# Patient Record
Sex: FEMALE | URBAN - NONMETROPOLITAN AREA
[De-identification: names, ages, dates, MRNs, and addresses within clinical notes are randomized per-mention and may not be internally consistent; named-entity substitution may affect disease eponyms.]

---

## 2017-06-22 ENCOUNTER — APPOINTMENT (RX ONLY)
Dept: URBAN - NONMETROPOLITAN AREA CLINIC 1 | Facility: CLINIC | Age: 57
Setting detail: DERMATOLOGY
End: 2017-06-22

## 2017-06-22 DIAGNOSIS — L82.1 OTHER SEBORRHEIC KERATOSIS: ICD-10-CM

## 2017-06-22 DIAGNOSIS — L81.1 CHLOASMA: ICD-10-CM

## 2017-06-22 PROBLEM — E05.90 THYROTOXICOSIS, UNSPECIFIED WITHOUT THYROTOXIC CRISIS OR STORM: Status: ACTIVE | Noted: 2017-06-22

## 2017-06-22 PROCEDURE — ? DIAGNOSIS COMMENT

## 2017-06-22 PROCEDURE — ? COUNSELING

## 2017-06-22 PROCEDURE — 99203 OFFICE O/P NEW LOW 30 MIN: CPT

## 2017-06-22 PROCEDURE — ? IN-HOUSE DISPENSING PHARMACY

## 2017-06-22 ASSESSMENT — LOCATION SIMPLE DESCRIPTION DERM
LOCATION SIMPLE: RIGHT LIP
LOCATION SIMPLE: GROIN

## 2017-06-22 ASSESSMENT — LOCATION DETAILED DESCRIPTION DERM
LOCATION DETAILED: RIGHT INGUINAL CREASE
LOCATION DETAILED: RIGHT UPPER CUTANEOUS LIP

## 2017-06-22 ASSESSMENT — LOCATION ZONE DERM
LOCATION ZONE: LIP
LOCATION ZONE: TRUNK

## 2017-06-22 NOTE — HPI: DISCOLORATION (MELASMA)
How Severe Are They?: moderate
Is This A New Presentation, Or A Follow-Up?: Discoloration
Additional History: Patient has used prescription hydroquinone cream in the past with good results

## 2017-06-22 NOTE — PROCEDURE: DIAGNOSIS COMMENT
Comment: Discussed that dark hairs on upper lip contribute to appearance of dark skin on the upper lip.  Patient states that she often shaves it.  Suggested laser hair removal may be helpful, but would need to use hydroquinone cream prior to treatment since laser can cause hyperpigmentation.
Detail Level: Simple

## 2017-06-22 NOTE — PROCEDURE: IN-HOUSE DISPENSING PHARMACY
Product 79 Price/Unit (In Dollars): 0
Product 71 Unit Type: mg
Product 1 Amount/Unit (Numbers Only): 1
Product 1 Refills: 3
Product 2 Unit Type: bottle(s)
Name Of Product 3: Latisse
Name Of Product 2: HQRA +
Detail Level: Simple
Name Of Product 1: Skin Lightening Cream #5
Product 2 Application Directions: Apply to brown spots at bedtime.  Start once or twice a week and gradually increase frequency as tolerated.
Product 3 Application Directions: Apply to base of upper eye lashes at bedtime.
Render Refills If Set To 0: Yes
Product 1 Application Directions: Apply to brown spots twice daily under skin care products and sunscreen.
Render Product Pricing In Note: No

## 2017-06-23 ENCOUNTER — OFFICE VISIT (OUTPATIENT)
Dept: ENDOCRINOLOGY | Facility: MEDICAL CENTER | Age: 57
End: 2017-06-23
Payer: COMMERCIAL

## 2017-06-23 VITALS
SYSTOLIC BLOOD PRESSURE: 124 MMHG | BODY MASS INDEX: 25.45 KG/M2 | DIASTOLIC BLOOD PRESSURE: 78 MMHG | OXYGEN SATURATION: 94 % | WEIGHT: 134.8 LBS | HEART RATE: 71 BPM | HEIGHT: 61 IN

## 2017-06-23 DIAGNOSIS — E05.00 GRAVES DISEASE: ICD-10-CM

## 2017-06-23 DIAGNOSIS — E05.90 HYPERTHYROIDISM: ICD-10-CM

## 2017-06-23 PROCEDURE — 99204 OFFICE O/P NEW MOD 45 MIN: CPT | Performed by: INTERNAL MEDICINE

## 2017-06-23 RX ORDER — PROPRANOLOL HYDROCHLORIDE 20 MG/1
20 TABLET ORAL 3 TIMES DAILY
COMMUNITY

## 2017-06-23 RX ORDER — METHIMAZOLE 5 MG/1
7.5 TABLET ORAL DAILY
COMMUNITY
End: 2017-06-23

## 2017-06-23 RX ORDER — METHIMAZOLE 5 MG/1
5 TABLET ORAL DAILY
Qty: 90 TAB | COMMUNITY
Start: 2017-06-23 | End: 2017-08-23 | Stop reason: SDUPTHER

## 2017-06-23 RX ORDER — ESCITALOPRAM OXALATE 20 MG/1
20 TABLET ORAL DAILY
COMMUNITY

## 2017-06-23 NOTE — MR AVS SNAPSHOT
"Norm Leyva   2017 8:40 AM   Office Visit   MRN: [id number]    Department:  Endocrinology Med [facility]   Dept Phone:  [phone]    Description:  Female : 1960   Provider:  Raymond Juarez M.D.           Reason for Visit     New Patient Thyroid      Allergies as of 2017     Allergen Noted Reactions    Ciprofloxacin Hcl 2017   Hives    itching      You were diagnosed with     Graves disease   [755071]       Hyperthyroidism   [457664]         Vital Signs     Blood Pressure Pulse Height Weight Body Mass Index Oxygen Saturation    124/78 mmHg 71 1.549 m (5' 1\") 61.145 kg (134 lb 12.8 oz) 25.48 kg/m2 94%    Smoking Status                   Never Smoker            Basic Information     Date Of Birth Sex Race Ethnicity Preferred Language    1960 Female White Non- English      Problem List              ICD-10-CM Priority Class Noted - Resolved    Graves disease E05.00   2017 - Present    Hyperthyroidism E05.90   2017 - Present      Health Maintenance        Date Due Completion Dates    IMM DTaP/Tdap/Td Vaccine (1 - Tdap) 1980 ---    PAP SMEAR 1982 ---    MAMMOGRAM 3/13/2005 3/13/2004    COLONOSCOPY 2011 ---            Current Immunizations     No immunizations on file.      Below and/or attached are the medications your provider expects you to take. Review all of your home medications and newly ordered medications with your provider and/or pharmacist. Follow medication instructions as directed by your provider and/or pharmacist. Please keep your medication list with you and share with your provider. Update the information when medications are discontinued, doses are changed, or new medications (including over-the-counter products) are added; and carry medication information at all times in the event of emergency situations     Allergies:  CIPROFLOXACIN HCL - Hives               Medications  Valid as of: 2017 -  9:12 AM    Generic Name Brand Name Tablet " Size Instructions for use    Escitalopram Oxalate (Tab) LEXAPRO 20 MG Take 20 mg by mouth every day.        Ibuprofen (Cap) Ibuprofen 200 MG Take  by mouth as needed.        MethIMAzole (Tab) TAPAZOLE 5 MG Take 1 Tab by mouth every day.        Propranolol HCl (Tab) INDERAL 20 MG Take 20 mg by mouth 3 times a day.        .                 Medicines prescribed today were sent to:     [place], [place] - [address]    [address] [facility] [address]    Phone: [phone] Fax: [phone]    Open 24 Hours?: No      Medication refill instructions:       If your prescription bottle indicates you have medication refills left, it is not necessary to call your provider’s office. Please contact your pharmacy and they will refill your medication.    If your prescription bottle indicates you do not have any refills left, you may request refills at any time through one of the following ways: The online [employer] system (except Urgent Care), by calling your provider’s office, or by asking your pharmacy to contact your provider’s office with a refill request. Medication refills are processed only during regular business hours and may not be available until the next business day. Your provider may request additional information or to have a follow-up visit with you prior to refilling your medication.   *Please Note: Medication refills are assigned a new Rx number when refilled electronically. Your pharmacy may indicate that no refills were authorized even though a new prescription for the same medication is available at the pharmacy. Please request the medicine by name with the pharmacy before contacting your provider for a refill.        Your To Do List     Future Labs/Procedures Complete By Expires    FREE THYROXINE  As directed 6/23/2018    FREE THYROXINE  As directed 6/23/2018    FREE THYROXINE  As directed 6/23/2018    TSH  As directed 6/23/2018    TSH  As directed 6/23/2018    TSH  As  directed 6/23/2018      Instructions    Watch for two rare but very important potential side effects of methimazole:   1. Suppression of infection-fighting white blood cells -- leading to fever due to infection (can be any type of infection)  2. Liver damage --leading to jaundice (yellowness of eyes/skin, dark colored urine)    So in case you develop fever or jaundice (yellowness of eyes/skin) you stop methimazole right away and call our clinic, so that we can order labs.          DealHamster Access Code: [email or web address]  Expires: 7/23/2017  9:12 AM    DealHamster  A secure, online tool to manage your health information     [employer]’s DealHamster® is a secure, online tool that connects you to your personalized health information from the privacy of your home -- day or night - making it very easy for you to manage your healthcare. Once the activation process is completed, you can even access your medical information using the DealHamster radha, which is available for free in the Apple Radha store or Google Play store.     DealHamster provides the following levels of access (as shown below):   My Chart Features   [facility] Doctor [facility]  Specialists [facility]  Urgent  Care [facility]  Primary Care  Doctor   Email your healthcare team securely and privately 24/7 X X X    Manage appointments: schedule your next appointment; view details of past/upcoming appointments X      Request prescription refills. X      View recent personal medical records, including lab and immunizations X X X X   View health record, including health history, allergies, medications X X X X   Read reports about your outpatient visits, procedures, consult and ER notes X X X X   See your discharge summary, which is a recap of your hospital and/or ER visit that includes your diagnosis, lab results, and care plan. X X       How to register for DealHamster:  1. Go to  [email or web address].  2. Click on the Sign Up Now box, which takes you to the New Member  Sign Up page. You will need to provide the following information:  a. Enter your Tittat Access Code exactly as it appears at the top of this page. (You will not need to use this code after you’ve completed the sign-up process. If you do not sign up before the expiration date, you must request a new code.)   b. Enter your date of birth.   c. Enter your home email address.   d. Click Submit, and follow the next screen’s instructions.  3. Create a Tittat ID. This will be your Tittat login ID and cannot be changed, so think of one that is secure and easy to remember.  4. Create a Tittat password. You can change your password at any time.  5. Enter your Password Reset Question and Answer. This can be used at a later time if you forget your password.   6. Enter your e-mail address. This allows you to receive e-mail notifications when new information is available in Tittat.  7. Click Sign Up. You can now view your health information.    For assistance activating your Tittat account, call [phone]

## 2017-06-23 NOTE — PATIENT INSTRUCTIONS
Watch for two rare but very important potential side effects of methimazole:   1. Suppression of infection-fighting white blood cells -- leading to fever due to infection (can be any type of infection)  2. Liver damage --leading to jaundice (yellowness of eyes/skin, dark colored urine)    So in case you develop fever or jaundice (yellowness of eyes/skin) you stop methimazole right away and call our clinic, so that we can order labs.

## 2017-06-23 NOTE — PROGRESS NOTES
New Patient Consult Note  PCP: Shena Francis PA-C    Reason for consult: Hyperthyroidism    HPI:  Norm Leyva is a 56 y.o. old patient who comes in today for evaluation of hyperthyroidism. Labs in 2016 and 2016 showed suppressed TSH of 0.02. She also had significantly elevated TSI. Radioactive iodine uptake and scan in 2017 showed diffusely increased uptake consistent with diagnoses of hyperthyroidism. In 2017 she was started on methimazole, since May 2017 she is on methimazole 7.5 mg daily. She feels tired. She has gained 30 pounds. Previously before the diagnosis of hyperthyroidism she was experiencing unintentional weight loss, palpitations, racing heart, hand tremors, which have now resolved. She has family history of hypothyroidism. She has seen an ophthalmologist, was found to have slight proptosis of right eye, no pain or pressure behind the eyes, occasional intermittent double vision.     ROS:  Constitutional: Positive for weight gain  Cardiac: No palpitations or racing heart  Resp: No shortness of breath  All other systems were reviewed and were negative.    Past Medical History:  Patient Active Problem List    Diagnosis Date Noted   • Graves disease 2017   • Hyperthyroidism 2017       Past Surgical History:  Past Surgical History   Procedure Laterality Date   • Cystoscopy stent placement  1/15/11   • Primary c section  84; 82        • Cystoscopy  2011     Performed by NATASHA DONOVAN at [facility]   • Ureteroscopy  2011     Performed by NATASHA DONOVAN at [facility]       Allergies:  Ciprofloxacin hcl    Social History:  Social History     Social History   • Marital Status:      Spouse Name: N/A   • Number of Children: N/A   • Years of Education: N/A     Occupational History   • Not on file.     Social History Main Topics   • Smoking status: Never Smoker    • Smokeless tobacco: Not  "on file   • Alcohol Use: No   • Drug Use: No   • Sexual Activity: Not on file     Other Topics Concern   • Not on file     Social History Narrative       Family History:  Family History   Problem Relation Age of Onset   • Cancer     • Heart Disease         Medications:    Current outpatient prescriptions:   •  escitalopram (LEXAPRO) 20 MG tablet, Take 20 mg by mouth every day., Disp: , Rfl:   •  propranolol (INDERAL) 20 MG Tab, Take 20 mg by mouth 3 times a day., Disp: , Rfl:   •  methimazole (TAPAZOLE) 5 MG Tab, Take 1 Tab by mouth every day., Disp: 90 Tab, Rfl:   •  Ibuprofen 200 MG CAPS, Take  by mouth as needed., Disp: , Rfl:     Labs:  Labs on 6/20/2017: Free T4 0.61    Physical Examination:  Vital signs: /78 mmHg  Pulse 71  Ht 1.549 m (5' 1\")  Wt 61.145 kg (134 lb 12.8 oz)  BMI 25.48 kg/m2  SpO2 94%  General: No apparent distress, cooperative  Eyes: No scleral icterus or discharge  ENMT: Normal on external inspection of nose, lips  Neck: No abnormal masses on inspection  Resp: Normal effort, clear to auscultation bilaterally   CVS: Regular rate and rhythm, S1 S2 normal, no murmur   Extremities: No edema  Neuro: Alert and oriented  Skin: No rash  Psych: Normal mood and affect    Assessment and Plan:    1. Graves disease, hyperthyroidism  · Recent free T4 is at the lower end of normal at 0.61 on methimazole 7.5 mg daily  · Lower methimazole to 5 mg daily  · Advised to repeat labs for TSH and free T4 every one month for now  · We discussed about potential side effects of methimazole including agranulocytosis and hepatotoxicity  · We also discussed about radioactive iodine ablation of thyroidectomy as treatment options, she is interested in continuing methimazole  - FREE THYROXINE; Future  - TSH; Future  - TSH; Future  - FREE THYROXINE; Future  - FREE THYROXINE; Future  - TSH; Future    Return in about 3 months (around 9/23/2017).    Thank you for allowing me to participate in the care of this " patient.    Raymond Juarez M.D.  06/23/2017    CC:   Shena Francis PA-C    This note was created using voice recognition software (Dragon). The accuracy of the dictation is limited by the abilities of the software. I have reviewed the note prior to signing, however some errors in grammar and context are still possible. If you have any questions related to this note please do not hesitate to contact our office.

## 2017-07-21 ENCOUNTER — TELEPHONE (OUTPATIENT)
Dept: ENDOCRINOLOGY | Facility: MEDICAL CENTER | Age: 57
End: 2017-07-21

## 2017-07-25 ENCOUNTER — TELEPHONE (OUTPATIENT)
Dept: ENDOCRINOLOGY | Facility: MEDICAL CENTER | Age: 57
End: 2017-07-25

## 2017-07-26 NOTE — TELEPHONE ENCOUNTER
Thyroid hormone levels in July are slightly on the high side (in June free T4 was low, that is why we lowered the dose of methimazole, now free T4 is in the normal range but towards the upper end).    I would recommend to continue methimazole 5 mg daily. And repeat labs for TSH and free T4 in one month.        ____________________________________________  Labs on 7/20/2017: TSH 0.03, free T4 1.56  Labs on 6/20/2017: Free T4 0.61

## 2017-08-23 ENCOUNTER — TELEPHONE (OUTPATIENT)
Dept: ENDOCRINOLOGY | Facility: MEDICAL CENTER | Age: 57
End: 2017-08-23

## 2017-08-23 DIAGNOSIS — E05.90 HYPERTHYROIDISM: ICD-10-CM

## 2017-08-23 RX ORDER — METHIMAZOLE 5 MG/1
7.5 TABLET ORAL DAILY
Qty: 135 TAB | Refills: 1 | Status: SHIPPED | OUTPATIENT
Start: 2017-08-23 | End: 2017-11-28 | Stop reason: SDUPTHER

## 2017-08-23 NOTE — TELEPHONE ENCOUNTER
Yes, lets increase the dose to 7.5 mg daily. I will send a prescription for 5 mg tablets, 1-1/2 tablets per day.

## 2017-08-23 NOTE — TELEPHONE ENCOUNTER
Called Pt and notified     Free T4 is within normal range at 1.3. TSH is suppressed at 0.02. For now I would recommend to continue on methimazole 7.5 mg daily. Repeat labs for TSH and free T4 in 4-6 weeks.    Pt mentioned that she is taking Methimazole 5 mg once a day.  Do she need to increase her dose to 7.5 mg once a day?    If so, she need a new Rx for 2.5 mg send to [facility]    Thank you  Rayna

## 2017-11-28 DIAGNOSIS — E05.90 HYPERTHYROIDISM: ICD-10-CM

## 2017-11-28 RX ORDER — METHIMAZOLE 5 MG/1
7.5 TABLET ORAL DAILY
Qty: 135 TAB | Refills: 1 | Status: SHIPPED | OUTPATIENT
Start: 2017-11-28

## 2018-01-31 ENCOUNTER — RX ONLY (OUTPATIENT)
Age: 58
Setting detail: RX ONLY
End: 2018-01-31

## 2018-01-31 RX ORDER — FLUOCINOLONE ACETONIDE, HYDROQUINONE, AND TRETINOIN .1; 40; .5 MG/G; MG/G; MG/G
CREAM TOPICAL
Qty: 1 | Refills: 0 | Status: ERX | COMMUNITY
Start: 2018-01-31

## 2018-02-05 ENCOUNTER — RX ONLY (OUTPATIENT)
Age: 58
Setting detail: RX ONLY
End: 2018-02-05

## 2018-02-05 RX ORDER — HYDROQUINONE 4 %
CREAM (GRAM) TOPICAL
Qty: 1 | Refills: 3 | Status: ERX | COMMUNITY
Start: 2018-02-05

## 2024-02-17 ENCOUNTER — APPOINTMENT (OUTPATIENT)
Dept: RADIOLOGY | Facility: MEDICAL CENTER | Age: 64
End: 2024-02-17
Attending: EMERGENCY MEDICINE
Payer: COMMERCIAL

## 2024-02-17 ENCOUNTER — HOSPITAL ENCOUNTER (EMERGENCY)
Facility: MEDICAL CENTER | Age: 64
End: 2024-02-17
Attending: EMERGENCY MEDICINE
Payer: COMMERCIAL

## 2024-02-17 VITALS
OXYGEN SATURATION: 97 % | DIASTOLIC BLOOD PRESSURE: 70 MMHG | HEART RATE: 67 BPM | TEMPERATURE: 98.3 F | WEIGHT: 163 LBS | HEIGHT: 60 IN | BODY MASS INDEX: 32 KG/M2 | SYSTOLIC BLOOD PRESSURE: 150 MMHG | RESPIRATION RATE: 18 BRPM
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
## 2024-02-17 DIAGNOSIS — S00.83XA CONTUSION OF FACE, INITIAL ENCOUNTER: ICD-10-CM

## 2024-02-17 DIAGNOSIS — S00.03XA CONTUSION OF SCALP, INITIAL ENCOUNTER: ICD-10-CM

## 2024-02-17 DIAGNOSIS — T07.XXXA ABRASIONS OF MULTIPLE SITES: ICD-10-CM

## 2024-02-17 PROCEDURE — 90471 IMMUNIZATION ADMIN: CPT

## 2024-02-17 PROCEDURE — 304217 HCHG IRRIGATION SYSTEM

## 2024-02-17 PROCEDURE — 90715 TDAP VACCINE 7 YRS/> IM: CPT | Performed by: EMERGENCY MEDICINE

## 2024-02-17 PROCEDURE — 70486 CT MAXILLOFACIAL W/O DYE: CPT

## 2024-02-17 PROCEDURE — 70450 CT HEAD/BRAIN W/O DYE: CPT

## 2024-02-17 PROCEDURE — 99284 EMERGENCY DEPT VISIT MOD MDM: CPT

## 2024-02-17 PROCEDURE — 72125 CT NECK SPINE W/O DYE: CPT

## 2024-02-17 PROCEDURE — 700111 HCHG RX REV CODE 636 W/ 250 OVERRIDE (IP): Mod: JZ | Performed by: EMERGENCY MEDICINE

## 2024-02-17 RX ORDER — KETOROLAC TROMETHAMINE 15 MG/ML
15 INJECTION, SOLUTION INTRAMUSCULAR; INTRAVENOUS ONCE
Status: DISCONTINUED | OUTPATIENT
Start: 2024-02-17 | End: 2024-02-17

## 2024-02-17 RX ADMIN — FENTANYL CITRATE 50 MCG: 50 INJECTION, SOLUTION INTRAMUSCULAR; INTRAVENOUS at 13:17

## 2024-02-17 RX ADMIN — CLOSTRIDIUM TETANI TOXOID ANTIGEN (FORMALDEHYDE INACTIVATED), CORYNEBACTERIUM DIPHTHERIAE TOXOID ANTIGEN (FORMALDEHYDE INACTIVATED), BORDETELLA PERTUSSIS TOXOID ANTIGEN (GLUTARALDEHYDE INACTIVATED), BORDETELLA PERTUSSIS FILAMENTOUS HEMAGGLUTININ ANTIGEN (FORMALDEHYDE INACTIVATED), BORDETELLA PERTUSSIS PERTACTIN ANTIGEN, AND BORDETELLA PERTUSSIS FIMBRIAE 2/3 ANTIGEN 0.5 ML: 5; 2; 2.5; 5; 3; 5 INJECTION, SUSPENSION INTRAMUSCULAR at 13:14

## 2024-02-17 ASSESSMENT — LIFESTYLE VARIABLES: DO YOU DRINK ALCOHOL: NO

## 2024-02-17 NOTE — ED PROVIDER NOTES
ED Provider Note    CHIEF COMPLAINT  Chief Complaint   Patient presents with    T-5000 FALL    Neck Pain    T-5000 Facial Trauma       EXTERNAL RECORDS REVIEWED  Outside records show a history of Graves' disease, followed closely in the [facility] nearby in [place].    HPI/ROS  LIMITATION TO HISTORY   Patient appears drowsy.    OUTSIDE HISTORIAN(S):  EMS contributes to history on arrival    Norm Leyva is a 63 y.o. female who presents to the emergency department by EMS, after what she reports was a mechanical ground-level fall without prodrome of illness.  She says that she tripped and fell forward, hitting the left side of her forehead and left side of her upper lip, and the bridge of her nose.  She reports a diffuse frontal headache, light sensitivity, preferring not to open her eyes.  Pupils are equal round and reactive.  Speech is clear, no signs of altered mental status.  Moving all extremities.  No focal deficits.  She does report a sense of malocclusion.  No obvious dental fracture.    PAST MEDICAL HISTORY   has a past medical history of Anxiety, Depression, Renal disorder (1/15/11), and Thyroid disease.    SURGICAL HISTORY   has a past surgical history that includes cystoscopy stent placement (1/15/11); primary c section (8/21/84; 7/1/82); cystoscopy (2/24/2011); and ureteroscopy (2/24/2011).    FAMILY HISTORY  Family History   Problem Relation Age of Onset    Cancer Other     Heart Disease Other        SOCIAL HISTORY  Social History     Tobacco Use    Smoking status: Never    Smokeless tobacco: Never   Vaping Use    Vaping Use: Never used   Substance and Sexual Activity    Alcohol use: No    Drug use: No    Sexual activity: Not on file       CURRENT MEDICATIONS  Home Medications       Reviewed by Nedra Bolaños R.N. (Registered Nurse) on 02/17/24 at 1242  Med List Status: Partial     Medication Last Dose Status   escitalopram (LEXAPRO) 20 MG tablet  Active   Ibuprofen 200 MG CAPS   Active   methimazole (TAPAZOLE) 5 MG Tab  Active   propranolol (INDERAL) 20 MG Tab  Active                    ALLERGIES  Allergies   Allergen Reactions    Ciprofloxacin Hcl Hives     itching         PHYSICAL EXAM  VITAL SIGNS: BP (!) 150/70   Pulse 67   Temp 36.8 °C (98.3 °F) (Temporal)   Resp 18   Ht 1.524 m (5')   Wt 73.9 kg (163 lb)   SpO2 97%   BMI 31.83 kg/m²   Pulse ox interpretation: I interpret this pulse ox as normal.  Constitutional: Alert, seems slightly drowsy.  HENT: Left forehead hematoma, bruising to left side of upper lip, abrasion across the bridge of the nose, bilateral external ears normal, Nose normal.   Eyes: Conjunctiva normal, Non-icteric.   Neck: Patient is quite anxious about C-spine palpation, very jumpy, unreliable exam.  Lymphatic: No lymphadenopathy noted.   Cardiovascular: Regular rate and rhythm, no murmurs.   Thorax & Lungs: Normal breath sounds, No respiratory distress, No wheezing  Abdomen: Bowel sounds normal, Soft, No tenderness, No masses, No pulsatile masses. No peritoneal signs.  Skin: Warm, Dry, No erythema, No rash.   Back: No midline bony tenderness.   Extremities: Intact distal pulses, No edema, No cyanosis.  Musculoskeletal: Good range of motion in all major joints. No or major deformities noted.   Neurologic: Alert , Normal motor function, Normal sensory function, No focal deficits noted.   Psychiatric: Affect normal, Judgment normal, Mood normal.     DIAGNOSTIC STUDIES / PROCEDURES      RADIOLOGY  I have independently interpreted the diagnostic imaging associated with this visit and am waiting the final reading from the radiologist.     My preliminary interpretation is as follows: No intracranial bleed.    Radiologist interpretation:   CT-CSPINE WITHOUT PLUS RECONS   Final Result      Degenerative changes without acute fracture or dislocation in cervical spine.      CT-MAXILLOFACIAL W/O PLUS RECONS   Final Result            1. No acute maxillofacial fracture.       CT-HEAD W/O   Final Result         1. No acute intracranial abnormality. No evidence of acute intracranial hemorrhage or mass lesion.         2. Left frontal scalp hematoma.                     COURSE & MEDICAL DECISION MAKING    ED Observation Status?     INITIAL ASSESSMENT, COURSE AND PLAN  Care Narrative:     1:02 PM patient evaluated the bedside.  She had what she is clear was mechanical ground-level fall, with a headache, and slight drowsiness, though neurologically intact.  There is no obvious frontal scalp contusion, lip contusion, nasal abrasion, and an unreliable exam of the C-spine, so she will be imaged with head CT, C-spine CT, maxillofacial CT, treated with intramuscular fentanyl, and tetanus shot update.    1:29 PM patient reevaluated at the bedside.  Her  is now present.  We discussed her results, which are positive only for soft tissue injuries.  She has not had any changes in condition, and deterioration in symptoms, no changes in mental status, awake and alert, happy to be discharged home with close return precautions.  Discussed the above with she and her .     The patient will return for new or worsening symptoms and is stable at the time of discharge.    The patient is referred to a primary physician for blood pressure management, diabetic screening, and for all other preventative health concerns.    DISPOSITION:  Patient will be discharged home in stable condition.    FOLLOW UP:  Immediate emergency department concerns for worsening symptoms were discussed with the patient and her  at the bedside.    OUTPATIENT MEDICATIONS:  Discharge Medication List as of 2/17/2024  3:14 PM            DISPOSITION AND DISCUSSIONS  Escalation of care considered, and ultimately not performed:acute inpatient care management, however at this time, the patient is most appropriate for outpatient management.  Fortunately, imaging was negative, she has no focal deficits, is not severely  concussed.    Decision tools and prescription drugs considered including, but not limited to: Pain Medications considered, but not indicated based on today's evaluation. .    FINAL DIAGNOSIS  1. Fall, initial encounter    2. Contusion of face, initial encounter    3. Contusion of scalp, initial encounter    4. Abrasions of multiple sites           Electronically signed by: Wild Kunz M.D., 2/17/2024 12:59 PM

## 2024-02-17 NOTE — DISCHARGE INSTRUCTIONS
Your scans here were unremarkable.  There is no sign of skull fracture, intracranial bleed, or facial or dental fracture.  Keep your scrapes and abrasions clean with simple soap and water, then apply antibiotic ointment or Vaseline to keep them moist until fully healed.  You may be sore, tired, nauseated, and generally feeling a little bit off.  This is understandable.  However, return to the emergency department if you have severe headache, confusion, coordination or speech difficulties, or other new or worsening concerns instead of gradual improvement.

## 2024-02-17 NOTE — ED NOTES
Pt BIB EMS from daughters home.  Pt was walking and tripped on the sidewalk and fell forward hitting her head with bruising and swelling to left side of face/forehead, c/o bilateral knee pain and reports right back pain when sitting up.  Pt also noted some neck pain and c-collar placed by EMS.  Pt denies N/T.  Pt A/Ox4, c/o dizziness and generalized weakness since fall.  ERP to see.

## 2024-02-17 NOTE — ED NOTES
Patient being wheeled out of ED with RN. Discharge instructions reviewed with patient and signed. IV was removed from arm. They verbalized understanding of follow up instructions. All belongings with patient. They ambulate with a steady gait

## 2024-05-28 ENCOUNTER — HOSPITAL ENCOUNTER (OUTPATIENT)
Dept: RADIOLOGY | Facility: MEDICAL CENTER | Age: 64
End: 2024-05-28
Attending: OPHTHALMOLOGY

## 2024-05-28 ENCOUNTER — DOCUMENTATION (OUTPATIENT)
Dept: OPHTHALMOLOGY | Facility: MEDICAL CENTER | Age: 64
End: 2024-05-28

## 2024-05-28 ENCOUNTER — OFFICE VISIT (OUTPATIENT)
Dept: OPHTHALMOLOGY | Facility: MEDICAL CENTER | Age: 64
End: 2024-05-28
Payer: COMMERCIAL

## 2024-05-28 DIAGNOSIS — E05.00 GRAVES DISEASE: ICD-10-CM

## 2024-05-28 DIAGNOSIS — H53.2 MONOCULAR DIPLOPIA OF BOTH EYES: ICD-10-CM

## 2024-05-28 DIAGNOSIS — H57.02 ANISOCORIA: ICD-10-CM

## 2024-05-28 RX ORDER — CALCIUM PHOSPHATE
POWDER (GRAM) MISCELLANEOUS
COMMUNITY

## 2024-05-28 RX ORDER — ESTRADIOL 0.1 MG/G
CREAM VAGINAL
COMMUNITY
Start: 2024-02-27

## 2024-05-28 RX ORDER — AMITRIPTYLINE HYDROCHLORIDE 10 MG/1
10-20 TABLET, FILM COATED ORAL
COMMUNITY
Start: 2024-03-14

## 2024-05-28 RX ORDER — CYCLOBENZAPRINE HCL 5 MG
5 TABLET ORAL
COMMUNITY
Start: 2024-04-02

## 2024-05-28 ASSESSMENT — VISUAL ACUITY
OS_SC: 20/30
OD_SC+: -2
OD_SC: J7
OS_PH_SC+: -3
OD_PH_SC: 20/25
OD_SC: 20/30
OS_SC: J7
OS_SC+: -1
METHOD: SNELLEN - LINEAR
OS_PH_SC: 20/25

## 2024-05-28 ASSESSMENT — SLIT LAMP EXAM - LIDS
COMMENTS: NORMAL
COMMENTS: NORMAL

## 2024-05-28 ASSESSMENT — CONF VISUAL FIELD
OD_SUPERIOR_NASAL_RESTRICTION: 0
OS_NORMAL: 1
OS_SUPERIOR_TEMPORAL_RESTRICTION: 0
OD_SUPERIOR_TEMPORAL_RESTRICTION: 0
OS_INFERIOR_TEMPORAL_RESTRICTION: 0
OS_INFERIOR_NASAL_RESTRICTION: 0
OD_INFERIOR_NASAL_RESTRICTION: 0
OD_NORMAL: 1
OD_INFERIOR_TEMPORAL_RESTRICTION: 0
OS_SUPERIOR_NASAL_RESTRICTION: 0

## 2024-05-28 ASSESSMENT — REFRACTION_MANIFEST
OS_SPHERE: -0.75
OD_CYLINDER: +1.25
OD_SPHERE: -0.50
OD_AXIS: 177
OS_CYLINDER: +1.25
OS_AXIS: 170

## 2024-05-28 ASSESSMENT — MARGIN REFLEX DISTANCE
OS_MRD2: 3
OD_MRD1: 1.5
OS_MRD1: 1.5
OD_MRD2: 3

## 2024-05-28 ASSESSMENT — CUP TO DISC RATIO
OD_RATIO: 0.3
OS_RATIO: 0.3

## 2024-05-28 ASSESSMENT — TONOMETRY
IOP_METHOD: I-CARE
OD_IOP_MMHG: 11
OS_IOP_MMHG: 10

## 2024-05-28 ASSESSMENT — EXTERNAL EXAM - LEFT EYE: OS_EXAM: NORMAL

## 2024-05-28 ASSESSMENT — EXTERNAL EXAM - RIGHT EYE: OD_EXAM: NORMAL

## 2024-05-28 NOTE — ASSESSMENT & PLAN NOTE
Reports onset of vertical diplopia several years ago with worsening following head injury 2/2024. Reports double vision is constant and still present with closure of either eye. Diplopia improves with pinhole OU. Alignment testing ortho, minimal esophoria on james (within range of normal). Full EOM.     Plan:   Discussed with patient diplopia is monocular and secondary to surface issue. No evidence of thyroid orbitopathy. Pt reports symptoms of dry eye, only using AT twice a day. Recommended increased use and follow up with Dr Monaco. Discussed signs and symptoms of binocular diplopia.

## 2024-05-28 NOTE — PROGRESS NOTES
Mri brain from [facility] reviewed. Agree normal MRI. No evidence of proptosis, EOM enlargement

## 2024-05-28 NOTE — ASSESSMENT & PLAN NOTE
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 Exam demonstrates OD 4.5mm and 6mm OS, both round and briskly reactive. No evidence of optic neuropathy. Given presentation of anisocoria since childhood with brisk pupillary reflexes, suspect congenital or physiologic. No structural abnormalities noted on prior CTH or MRI brain.

## 2024-05-28 NOTE — PROGRESS NOTES
Peds/Neuro Ophthalmology:   Anthony Rebolledo M.D.    Date & Time note created:    5/28/2024   9:15 AM     Referring MD / APRN:  Shena Francis P.A.-C., No att. providers found    Patient ID:  Name:             Norm Leyva     YOB: 1960  Age:                 63 y.o.  female   MRN:               [id number]    Chief Complaint/Reason for Visit:     Diplopia (New patient evaluation for both eyes)      History of Present Illness:      Norm Leyva is a 64 yo woman referred for double vision and concerns of thyroid eye disease.    The patient reports vertical diplopia for a number of years. The double vision is constant and vertical. She states it is worse at near, especially noticeable when reading. The double vision is present with closure of either eye. Of note she had a head injury 2/2024 and ever since she has had worse double vision. In 2/2024 patient fell on the ice and hit the left side of her face on the sidewalk. She did not have LOC but was groggy and confused. She had a CTH and CT neck done which was negative. After the head injury the images are still vertical but are now further apart.    Patient additionally reports symptoms of dry eye and was diagnosed with dry eye by Dr Monaco. The patient uses AT BID. She reports +grittiness and burning, redness occasionally of the eyes.She reports intermittent pain behind the eyes and pain with eye movements. The pain occurs at least daily. She endorses periorbital edema. She denies any proptosis.     Patient follows with endocrinology Dr Lazo. She was diagnosed in late 2016/early 2017. A thyroid uptake scan 2/16/2017 demonstrated 76% uptake at 24 hours in a pattern consistent with Graves disease. TSI was elevated 516 in 3/2020. She reports her thyroid function has been on a stable on Methimazole7.5mg over a number of years. She denies prior Radioactive iodine. She denies any prior smoking history, no prior history of Crohns or UC, hearing loss, DM2,  htn.     Patient was referred to Dr. Monaco for evaluation of diplopia and had a CIARA score of 3 at the 24 visit for Thyroid eye disease.     2024 ([facility]– [place]) [place] of the orbits and read note no enlargement of the ocular muscles nor the periorbital fat.          Review of Systems:  ROS    Past Medical History:   Past Medical History:   Diagnosis Date    Anxiety     Double vision with both eyes open     Graves disease     Head injury 2024    Renal disorder 01/15/2011    kidney stone    Thyroid disease        Past Surgical History:  Past Surgical History:   Procedure Laterality Date    CYSTOSCOPY  2011    Performed by NATASHA DONOVAN at SURGERY [facility] ORS    URETEROSCOPY  2011    Performed by NATASHA DONOVAN at SURGERY [facility] ORS    CYSTOSCOPY STENT PLACEMENT  1/15/11    PRIMARY C SECTION  84; 82           Current Outpatient Medications:  Current Outpatient Medications   Medication Sig Dispense Refill    estradiol (ESTRACE) 0.1 MG/GM vaginal cream Initial: 500 mg three time weekly intravaginally for 2 weeks, then 1 g twice weekly for maintenance.      Red Yeast Rice Extract (RED YEAST RICE PO) Take  by mouth.      Calcium Phosphate Dibasic Powder Take  by mouth.      methimazole (TAPAZOLE) 5 MG Tab Take 1.5 Tabs by mouth every day. 135 Tab 1    escitalopram (LEXAPRO) 20 MG tablet Take 20 mg by mouth every day.      Ibuprofen 200 MG CAPS Take  by mouth as needed.      cyclobenzaprine (FLEXERIL) 5 mg tablet Take 5 mg by mouth. (Patient not taking: Reported on 2024)      amitriptyline (ELAVIL) 10 MG Tab Take 10-20 mg by mouth. (Patient not taking: Reported on 2024)      NON SPECIFIED GOLO weightloss supplement TID (Patient not taking: Reported on 2024)      propranolol (INDERAL) 20 MG Tab Take 20 mg by mouth 3 times a day. (Patient not taking: Reported on 2024)       No current facility-administered medications for this visit.        Allergies:  Allergies   Allergen Reactions    Ciprofloxacin Hcl Hives     itching    Kiwi Extract Hives, Itching and Swelling       Family History:  Family History   Problem Relation Age of Onset    Diabetes Mother     Hypertension Father     Other Father         Mac Degen OU    Cancer Other     Heart Disease Other        Social History:  Social History     Socioeconomic History    Marital status:      Spouse name: Not on file    Number of children: Not on file    Years of education: Not on file    Highest education level: Not on file   Occupational History    Not on file   Tobacco Use    Smoking status: Never    Smokeless tobacco: Never   Vaping Use    Vaping status: Never Used   Substance and Sexual Activity    Alcohol use: No    Drug use: No    Sexual activity: Not on file   Other Topics Concern    Not on file   Social History Narrative    Not on file     Social Determinants of Health     Financial Resource Strain: Not on File (2019)    Received from [employer]     Financial Resource Strain     Financial Resource Strain: 0   Food Insecurity: Not on File (2019)    Received from [employer]     Food Insecurity     Food: 0   Transportation Needs: Not on File (2019)    Received from [employer]     Transportation Needs     Transportation: 0   Physical Activity: Not on File (2019)    Received from [employer]     Physical Activity     Physical Activity: 0   Stress: Not on File (2019)    Received from [employer]     Stress     Stress: 0   Social Connections: Not on File (2019)    Received from [employer]     Social Connections     Social Connections and Isolation: 0   Intimate Partner Violence: Not on file   Housing Stability: Not on File (2019)    Received from [employer]     Housing Stability     Housin          Physical Exam:  Physical Exam    Oriented x 3  Weight/BMI: There is no height or weight on file to calculate BMI.  There were no vitals taken for this visit.    Base Eye Exam       Visual Acuity  (Snellen - Linear)         Right Left    Dist sc 20/30 -2 20/30 -1    Dist ph sc 20/25 20/25 -3    Near sc J7 J7   Reports improvement of double vision with pinhole OU             Tonometry (i-care, 8:20 AM)         Right Left    Pressure 11 10              Pupils         Pupils Dark Light Shape React APD    Right PERRL 4.5 3.5 Round Brisk None    Left PERRL 6 5 Round Brisk None              Visual Fields         Right Left     Full Full              Extraocular Movement         Right Left     Full, Ortho Full, Ortho              Neuro/Psych       Oriented x3: Yes    Mood/Affect: Normal                  Additional Tests       Color         Right Left    [place]sh[place]ara 12/12 12/12              Stereo       Fly: +    Animals: 3/3    Circles: 9/9                  Strabismus Exam       Reading #1   (Edited by: Anthony Rebolledo M.D.)      Method: Alternate cover    Observations: Ortho      Distance Near Near +3DS N Bifocals                      - - - - - -  Ortho  - - - - - -                      Ortho  - -  - -  Ortho  - -  - -  Ortho                      - - - - - -  Ortho  - - - - - -                           Reading #2   (Edited by: Anthony Rebolledo M.D.)      Method: Fu    Correction: sc      Distance Near Near +3DS N Bifocals                      - - - - - -  E 4 - - - - - -                      E 1 - -  - -  E 1 - -  - -  E 1                     - - - - - -  E 1 - - - - - -                               Slit Lamp and Fundus Exam       External Exam         Right Left    External Normal Normal    Palpebral fissure 4.5 mm 4.5 mm    MRD1 1.5 mm 1.5 mm    MRD2 3 mm 3 mm              Exophthalmometry (Base: 111 mm)         Right Left    Billie 15 mm 15 mm              Slit Lamp Exam         Right Left    Lids/Lashes Normal Normal    Conjunctiva/Sclera White and quiet White and quiet    Cornea Clear Clear    Anterior Chamber Deep and quiet Deep and quiet    Iris Round and reactive Round and reactive    Lens Trace Nuclear  sclerosis Trace Nuclear sclerosis              Fundus Exam         Right Left    Disc pink, sharp disc margins, flat pink, sharp disc margins, flat    C/D Ratio 0.3 0.3    Macula Normal Normal                  Refraction       Manifest Refraction         Sphere Cylinder Axis    Right -0.50 +1.25 177    Left -0.75 +1.25 170                    Pertinent Lab/Test/Imaging Review:  MRI brain wwo 5/21/24 (report only)  Unremarkable, no evidence of exophthalmos    Labs 4/24/24  TSH 1.29  ESR 13  CRP 6.8  TSI 318H  TSH Receptor Ab 1.22  Assessment and Plan:     Monocular diplopia of both eyes  Reports onset of vertical diplopia several years ago with worsening following head injury 2/2024. Reports double vision is constant and still present with closure of either eye. Diplopia improves with pinhole OU. Alignment testing ortho, minimal esophoria on james (within range of normal). Full EOM.     Plan:   Discussed with patient diplopia is monocular and secondary to surface issue. No evidence of thyroid orbitopathy. Pt reports symptoms of dry eye, only using AT twice a day. Recommended increased use and follow up with Dr Monaco. Discussed signs and symptoms of binocular diplopia.     Graves disease  62 yo woman with PMH of Graves disease who presents for evaluation of thyroid orbitopathy    Diagnosed with Graves in 2016/2017, stable on methimazole 7.5mg daily. Denies smoking history, iodine. Ocular symptoms include spontaneous eye pain, intermittent redness of eyes. Denies binocular diplopia, proptosis, periorbital edema.     Exam 5/28/24: CIARA 1 (eye pain). No evidence of proptosis, periorbital edema, lid retraction, conjunctival injection, chemosis, lagophthalmos. Afferent and efferent normal. No APD. +anisocoria with brisk pupillary reflexes (reports longstanding). Optic nerves pink and healthy. RNFL  85 OU. Alignment testing ortho. Full EOM    Plan:   No evidence of active thyroid orbitopathy at this time. CIARA 1. Pt has  symptoms of dry eye, but no evidence of lid retraction, proptosis, lagophthalmos which could exacerbate. The double vision is monocular and secondary to surface issue. Discussed use of artificial tears and to follow up with Dr Monaco for management. No evidence of optic neuropathy. 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 Exam demonstrates OD 4.5mm and 6mm OS, both round and briskly reactive. No evidence of optic neuropathy. Given presentation of anisocoria since childhood with brisk pupillary reflexes, suspect congenital or physiologic. No structural abnormalities noted on prior CTH or MRI brain.         Anthnoy Rebolledo M.D.

## 2024-05-28 NOTE — ASSESSMENT & PLAN NOTE
62 yo woman with PMH of Graves disease who presents for evaluation of thyroid orbitopathy    Diagnosed with Graves in 2016/2017, stable on methimazole 7.5mg daily. Denies smoking history, iodine. Ocular symptoms include spontaneous eye pain, intermittent redness of eyes. Denies binocular diplopia, proptosis, periorbital edema.     Exam 5/28/24: CIARA 1 (eye pain). No evidence of proptosis, periorbital edema, lid retraction, conjunctival injection, chemosis, lagophthalmos. Afferent and efferent normal. No APD. +anisocoria with brisk pupillary reflexes (reports longstanding). Optic nerves pink and healthy. RNFL  85 OU. Alignment testing ortho. Full EOM    Plan:   No evidence of active thyroid orbitopathy at this time. CIARA 1. Pt has symptoms of dry eye, but no evidence of lid retraction, proptosis, lagophthalmos which could exacerbate. The double vision is monocular and secondary to surface issue. Discussed use of artificial tears and to follow up with Dr Monaco for management. No evidence of optic neuropathy. Pt to return for reevaluation in 4-6 months for reevaluation